# Patient Record
Sex: MALE | Race: WHITE
[De-identification: names, ages, dates, MRNs, and addresses within clinical notes are randomized per-mention and may not be internally consistent; named-entity substitution may affect disease eponyms.]

---

## 2021-05-20 ENCOUNTER — APPOINTMENT (OUTPATIENT)
Dept: NEUROLOGY | Facility: CLINIC | Age: 76
End: 2021-05-20
Payer: MEDICARE

## 2021-05-20 VITALS — SYSTOLIC BLOOD PRESSURE: 128 MMHG | HEART RATE: 83 BPM | DIASTOLIC BLOOD PRESSURE: 69 MMHG

## 2021-05-20 VITALS
BODY MASS INDEX: 23.62 KG/M2 | HEIGHT: 70 IN | HEART RATE: 78 BPM | SYSTOLIC BLOOD PRESSURE: 137 MMHG | WEIGHT: 165 LBS | DIASTOLIC BLOOD PRESSURE: 70 MMHG

## 2021-05-20 DIAGNOSIS — H53.9 UNSPECIFIED VISUAL DISTURBANCE: ICD-10-CM

## 2021-05-20 PROCEDURE — 99072 ADDL SUPL MATRL&STAF TM PHE: CPT

## 2021-05-20 PROCEDURE — 99205 OFFICE O/P NEW HI 60 MIN: CPT

## 2021-05-20 NOTE — PHYSICAL EXAM
[FreeTextEntry1] : Patient is awake and alert.  He is pleasant and cooperative with the exam\par Extraocular movements are intact, face is symmetric tongue and uvula midline and symmetric\par Strength is 5/5 in all extremities.  Deep tendon reflexes are equal and symmetric\par Plantars are downgoing\par MMSE 23/30 patient missing exact date, 2 items of recall poor attention span 2 out of 5 and spelling errors in sentence\par No resting action or postural tremors are seen\par Mild generalized slowness slightly more on the right\par No difficulty getting up from the chair\par Gait is slow slightly wide-based but otherwise steady\par Pull test is negative

## 2021-05-20 NOTE — DISCUSSION/SUMMARY
[FreeTextEntry1] : This is a 76-year-old right-handed female who presents along with his wife with chief complaints of cognitive changes, vision changes and a diagnosis of Parkinson's that was made while he was at [place] emergency room in January 2021.  Patient's biggest concern is his difficulty with vision his wife finds that his memory is still poor since he had Covid in January 2021\par On exam patient scored 23 out of 30 on Mini-Mental status exam, did not have prominent parkinsonian features although he did have generalized slowness slightly more on the right with a wide-based gait\par \par Impression-history is concerning for neurodegenerative process such as Alzheimer's, PCA, Lewy body, would also like to rule out normal pressure hydrocephalus\par \par unfortunately patient did not bring any of his records with him.  He states he has had extensive work-up including MRIs and labs while he was hospitalized\par Also does not have a current list of medications with him\par \par Plan\par He and his wife are encouraged to please get his records and medication list to us\par 1 month follow-up\par Neuro ophthalmology consultation\par Neurocognitive evaluation-Dr. Cook/Dr. Morse\par May consider FDG PET scan if not already done\par All questions were addressed and answered\par

## 2021-05-20 NOTE — HISTORY OF PRESENT ILLNESS
[FreeTextEntry1] : This is a 76-year-old right-handed male who presents with chief complaints of difficulty with memory and vision.  At this visit he is accompanied by his wife.  History is obtained from both of them\par \par His wife states that patient had been living independently and without any difficulty until last year.  Around summertime they noticed that he was dragging his feet was having confusion on and off and some right-handed tremor.  In January 2021 he was more confused and was taken to [place] emergency room where he was found to be Covid positive.  He was also catatonic at that visit.  He had hallucinations in the hospital.  He was given a diagnosis of Parkinson's disease and initiated on Sinemet.  He was in the hospital from January to March.  Thereafter [facility] where after extensive rehab he had improvement in his speaking, swallowing and walking.  His hallucinations are completely resolved.  Currently he lives in an assisted living facility since the last 2-1/2 weeks.\par \par At present patient reports that his biggest complaint is difficulty with his vision.  He is unable to read or watch TV.  He saw an eye doctor last year and had new glasses made.  But now he feels very unfocused with them.  \par \par His wife states that she finds confusion to be persistent.  Especially his short-term memory is very poor.  Long-term is intact.  Currently no hallucinations\par \par She does not live with him and is not sure if he has any REM behavior disorder.  Patient endorses having vivid dreams\par \par He also describes a spinning sensation, especially if he bends down and gets up quickly.  He denies lightheadedness, he feels his balance is off but has not had any falls recently.\par \par Unfortunately that did not have any of his work-up or list of medications with them.\par Review of systems-a complete review of systems was performed and is negative except as listed in HPI\par

## 2021-08-31 ENCOUNTER — NON-APPOINTMENT (OUTPATIENT)
Age: 76
End: 2021-08-31

## 2021-08-31 ENCOUNTER — APPOINTMENT (OUTPATIENT)
Dept: INTERNAL MEDICINE | Facility: CLINIC | Age: 76
End: 2021-08-31
Payer: MEDICARE

## 2021-08-31 VITALS
OXYGEN SATURATION: 95 % | SYSTOLIC BLOOD PRESSURE: 103 MMHG | HEART RATE: 88 BPM | BODY MASS INDEX: 24.82 KG/M2 | TEMPERATURE: 98 F | DIASTOLIC BLOOD PRESSURE: 67 MMHG | WEIGHT: 173 LBS

## 2021-08-31 DIAGNOSIS — Z23 ENCOUNTER FOR IMMUNIZATION: ICD-10-CM

## 2021-08-31 DIAGNOSIS — H54.7 UNSPECIFIED VISUAL LOSS: ICD-10-CM

## 2021-08-31 DIAGNOSIS — T88.7XXA UNSPECIFIED ADVERSE EFFECT OF DRUG OR MEDICAMENT, INITIAL ENCOUNTER: ICD-10-CM

## 2021-08-31 DIAGNOSIS — G47.9 SLEEP DISORDER, UNSPECIFIED: ICD-10-CM

## 2021-08-31 DIAGNOSIS — F32.9 MAJOR DEPRESSIVE DISORDER, SINGLE EPISODE, UNSPECIFIED: ICD-10-CM

## 2021-08-31 DIAGNOSIS — Z92.29 PERSONAL HISTORY OF OTHER DRUG THERAPY: ICD-10-CM

## 2021-08-31 DIAGNOSIS — E11.9 TYPE 2 DIABETES MELLITUS W/OUT COMPLICATIONS: ICD-10-CM

## 2021-08-31 DIAGNOSIS — N18.30 TYPE 2 DIABETES MELLITUS WITH DIABETIC CHRONIC KIDNEY DISEASE: ICD-10-CM

## 2021-08-31 DIAGNOSIS — N40.0 BENIGN PROSTATIC HYPERPLASIA WITHOUT LOWER URINARY TRACT SYMPMS: ICD-10-CM

## 2021-08-31 DIAGNOSIS — R97.20 ELEVATED PROSTATE, SPECIFIC ANTIGEN [PSA]: ICD-10-CM

## 2021-08-31 DIAGNOSIS — R26.9 UNSPECIFIED ABNORMALITIES OF GAIT AND MOBILITY: ICD-10-CM

## 2021-08-31 DIAGNOSIS — U07.1 COVID-19: ICD-10-CM

## 2021-08-31 DIAGNOSIS — K44.9 DIAPHRAGMATIC HERNIA W/OUT OBSTRUCTION OR GANGRENE: ICD-10-CM

## 2021-08-31 DIAGNOSIS — K59.00 CONSTIPATION, UNSPECIFIED: ICD-10-CM

## 2021-08-31 DIAGNOSIS — G20 PARKINSON'S DISEASE: ICD-10-CM

## 2021-08-31 DIAGNOSIS — K21.9 GASTRO-ESOPHAGEAL REFLUX DISEASE W/OUT ESOPHAGITIS: ICD-10-CM

## 2021-08-31 DIAGNOSIS — R41.89 OTHER SYMPTOMS AND SIGNS INVOLVING COGNITIVE FUNCTIONS AND AWARENESS: ICD-10-CM

## 2021-08-31 DIAGNOSIS — R13.10 DYSPHAGIA, UNSPECIFIED: ICD-10-CM

## 2021-08-31 DIAGNOSIS — Z78.9 OTHER SPECIFIED HEALTH STATUS: ICD-10-CM

## 2021-08-31 DIAGNOSIS — Z00.00 ENCOUNTER FOR GENERAL ADULT MEDICAL EXAMINATION W/OUT ABNORMAL FINDINGS: ICD-10-CM

## 2021-08-31 DIAGNOSIS — E11.22 TYPE 2 DIABETES MELLITUS WITH DIABETIC CHRONIC KIDNEY DISEASE: ICD-10-CM

## 2021-08-31 PROCEDURE — 82270 OCCULT BLOOD FECES: CPT

## 2021-08-31 PROCEDURE — 99204 OFFICE O/P NEW MOD 45 MIN: CPT | Mod: 25

## 2021-08-31 PROCEDURE — 99497 ADVNCD CARE PLAN 30 MIN: CPT

## 2021-08-31 PROCEDURE — G0444 DEPRESSION SCREEN ANNUAL: CPT | Mod: 59

## 2021-08-31 PROCEDURE — G0008: CPT

## 2021-08-31 PROCEDURE — G0439: CPT

## 2021-08-31 PROCEDURE — 90662 IIV NO PRSV INCREASED AG IM: CPT

## 2021-08-31 PROCEDURE — 93000 ELECTROCARDIOGRAM COMPLETE: CPT | Mod: 59

## 2021-08-31 PROCEDURE — 36415 COLL VENOUS BLD VENIPUNCTURE: CPT

## 2021-08-31 RX ORDER — INSULIN GLARGINE 100 [IU]/ML
100 INJECTION, SOLUTION SUBCUTANEOUS AT BEDTIME
Refills: 0 | Status: ACTIVE | COMMUNITY

## 2021-08-31 RX ORDER — OMEPRAZOLE 20 MG/1
20 CAPSULE, DELAYED RELEASE ORAL
Refills: 0 | Status: ACTIVE | COMMUNITY

## 2021-08-31 RX ORDER — POLYETHYLENE GLYCOL 3350 17 G/17G
17 POWDER, FOR SOLUTION ORAL DAILY
Qty: 1 | Refills: 2 | Status: ACTIVE | COMMUNITY
Start: 2021-08-31 | End: 1900-01-01

## 2021-08-31 RX ORDER — CARBIDOPA AND LEVODOPA 25; 100 MG/1; MG/1
25-100 TABLET, EXTENDED RELEASE ORAL 3 TIMES DAILY
Qty: 90 | Refills: 0 | Status: ACTIVE | COMMUNITY

## 2021-08-31 RX ORDER — METFORMIN HYDROCHLORIDE 1000 MG/1
1000 TABLET, COATED ORAL TWICE DAILY
Qty: 60 | Refills: 3 | Status: ACTIVE | COMMUNITY

## 2021-08-31 RX ORDER — MIRTAZAPINE 15 MG/1
15 TABLET, FILM COATED ORAL DAILY
Qty: 15 | Refills: 0 | Status: ACTIVE | COMMUNITY

## 2021-08-31 RX ORDER — DOCUSATE SODIUM 100 MG/1
100 CAPSULE ORAL 3 TIMES DAILY
Qty: 90 | Refills: 2 | Status: ACTIVE | COMMUNITY
Start: 2021-08-31 | End: 1900-01-01

## 2021-08-31 RX ORDER — TAMSULOSIN HYDROCHLORIDE 0.4 MG/1
0.4 CAPSULE ORAL
Qty: 30 | Refills: 0 | Status: ACTIVE | COMMUNITY

## 2021-08-31 RX ORDER — FAMOTIDINE 10 MG
10 TABLET ORAL
Refills: 0 | Status: ACTIVE | COMMUNITY

## 2021-09-01 ENCOUNTER — RESULT CHARGE (OUTPATIENT)
Age: 76
End: 2021-09-01

## 2021-09-01 PROBLEM — R13.10 DIFFICULTY SWALLOWING: Status: ACTIVE | Noted: 2021-08-31

## 2021-09-01 PROBLEM — Z92.29 COVID-19 VACCINE SERIES COMPLETED: Status: ACTIVE | Noted: 2021-08-31

## 2021-09-01 PROBLEM — G20 PARKINSON'S DISEASE: Status: ACTIVE | Noted: 2021-08-31

## 2021-09-01 PROBLEM — G47.9 DIFFICULTY SLEEPING: Status: ACTIVE | Noted: 2021-08-31

## 2021-09-01 PROBLEM — U07.1 COVID-19: Status: RESOLVED | Noted: 2021-08-31 | Resolved: 2021-09-01

## 2021-09-01 PROBLEM — R26.9 GAIT ABNORMALITY: Status: ACTIVE | Noted: 2021-05-20

## 2021-09-01 PROBLEM — E11.9 DIABETES: Status: ACTIVE | Noted: 2021-08-31

## 2021-09-01 PROBLEM — N40.0 BPH (BENIGN PROSTATIC HYPERPLASIA): Status: ACTIVE | Noted: 2021-08-31

## 2021-09-01 PROBLEM — F32.9 DEPRESSION: Status: ACTIVE | Noted: 2021-08-31

## 2021-09-01 PROBLEM — Z00.00 ENCOUNTER FOR PREVENTIVE HEALTH EXAMINATION: Status: ACTIVE | Noted: 2021-04-20

## 2021-09-01 PROBLEM — K44.9 HIATAL HERNIA: Status: ACTIVE | Noted: 2021-08-31

## 2021-09-01 PROBLEM — H54.7 POOR VISION: Status: ACTIVE | Noted: 2021-08-31

## 2021-09-01 PROBLEM — K59.00 CONSTIPATION: Status: ACTIVE | Noted: 2021-08-31

## 2021-09-01 PROBLEM — E11.22 DIABETES MELLITUS WITH STAGE 3 CHRONIC KIDNEY DISEASE: Status: ACTIVE | Noted: 2021-09-01

## 2021-09-01 PROBLEM — Z23 NEED FOR INFLUENZA VACCINATION: Status: ACTIVE | Noted: 2021-08-31

## 2021-09-01 PROBLEM — K21.9 GERD (GASTROESOPHAGEAL REFLUX DISEASE): Status: ACTIVE | Noted: 2021-08-31

## 2021-09-01 PROBLEM — R41.89 COGNITIVE CHANGES: Status: ACTIVE | Noted: 2021-05-20

## 2021-09-01 PROBLEM — R97.20 ELEVATED PSA: Status: ACTIVE | Noted: 2021-09-01

## 2021-09-01 PROBLEM — T88.7XXA MEDICATION SIDE EFFECT: Status: ACTIVE | Noted: 2021-09-01

## 2021-09-01 LAB
DATE COLLECTED: NORMAL
HEMOCCULT SP1 STL QL: NEGATIVE

## 2021-09-02 LAB
25(OH)D3 SERPL-MCNC: 29.7 NG/ML
ALBUMIN SERPL ELPH-MCNC: 4.5 G/DL
ALP BLD-CCNC: 65 U/L
ALT SERPL-CCNC: 14 U/L
ANION GAP SERPL CALC-SCNC: 13 MMOL/L
APPEARANCE: CLEAR
AST SERPL-CCNC: 16 U/L
BASOPHILS # BLD AUTO: 0.05 K/UL
BASOPHILS NFR BLD AUTO: 0.5 %
BILIRUB SERPL-MCNC: 0.4 MG/DL
BILIRUBIN URINE: NEGATIVE
BLOOD URINE: NEGATIVE
BUN SERPL-MCNC: 18 MG/DL
CALCIUM SERPL-MCNC: 9.7 MG/DL
CHLORIDE SERPL-SCNC: 103 MMOL/L
CHOLEST SERPL-MCNC: 172 MG/DL
CO2 SERPL-SCNC: 23 MMOL/L
COLOR: YELLOW
CREAT SERPL-MCNC: 1.39 MG/DL
CREAT SPEC-SCNC: 240 MG/DL
EOSINOPHIL # BLD AUTO: 0.09 K/UL
EOSINOPHIL NFR BLD AUTO: 1 %
ESTIMATED AVERAGE GLUCOSE: 123 MG/DL
FOLATE SERPL-MCNC: 13 NG/ML
GLUCOSE QUALITATIVE U: NEGATIVE
GLUCOSE SERPL-MCNC: 142 MG/DL
HBA1C MFR BLD HPLC: 5.9 %
HCT VFR BLD CALC: 48.7 %
HDLC SERPL-MCNC: 34 MG/DL
HGB BLD-MCNC: 14.7 G/DL
IMM GRANULOCYTES NFR BLD AUTO: 0.2 %
KETONES URINE: NORMAL
LDLC SERPL CALC-MCNC: 100 MG/DL
LEUKOCYTE ESTERASE URINE: NEGATIVE
LYMPHOCYTES # BLD AUTO: 1.05 K/UL
LYMPHOCYTES NFR BLD AUTO: 11.2 %
MAN DIFF?: NORMAL
MCHC RBC-ENTMCNC: 28.7 PG
MCHC RBC-ENTMCNC: 30.2 GM/DL
MCV RBC AUTO: 94.9 FL
MICROALBUMIN 24H UR DL<=1MG/L-MCNC: 1.8 MG/DL
MICROALBUMIN/CREAT 24H UR-RTO: 8 MG/G
MONOCYTES # BLD AUTO: 0.58 K/UL
MONOCYTES NFR BLD AUTO: 6.2 %
NEUTROPHILS # BLD AUTO: 7.58 K/UL
NEUTROPHILS NFR BLD AUTO: 80.9 %
NITRITE URINE: NEGATIVE
NONHDLC SERPL-MCNC: 138 MG/DL
PH URINE: 5.5
PLATELET # BLD AUTO: 161 K/UL
POTASSIUM SERPL-SCNC: 4.7 MMOL/L
PROT SERPL-MCNC: 7.1 G/DL
PROTEIN URINE: NORMAL
PSA SERPL-MCNC: 10.6 NG/ML
RBC # BLD: 5.13 M/UL
RBC # FLD: 14.9 %
SODIUM SERPL-SCNC: 139 MMOL/L
SPECIFIC GRAVITY URINE: 1.02
TRIGL SERPL-MCNC: 191 MG/DL
TSH SERPL-ACNC: 1.21 UIU/ML
UROBILINOGEN URINE: NORMAL
VIT B12 SERPL-MCNC: 322 PG/ML
WBC # FLD AUTO: 9.37 K/UL

## 2021-09-22 ENCOUNTER — APPOINTMENT (OUTPATIENT)
Dept: NEUROLOGY | Facility: CLINIC | Age: 76
End: 2021-09-22

## 2022-07-09 ENCOUNTER — RX RENEWAL (OUTPATIENT)
Age: 77
End: 2022-07-09

## 2022-07-09 RX ORDER — POLYETHYLENE GLYCOL 3350 17 G/17G
17 POWDER, FOR SOLUTION ORAL DAILY
Qty: 1 | Refills: 0 | Status: ACTIVE | COMMUNITY
Start: 2022-07-09 | End: 1900-01-01